# Patient Record
Sex: MALE | Race: WHITE | URBAN - METROPOLITAN AREA
[De-identification: names, ages, dates, MRNs, and addresses within clinical notes are randomized per-mention and may not be internally consistent; named-entity substitution may affect disease eponyms.]

---

## 2017-06-16 ENCOUNTER — APPOINTMENT (OUTPATIENT)
Dept: URBAN - METROPOLITAN AREA CLINIC 211 | Age: 27
Setting detail: DERMATOLOGY
End: 2017-06-20

## 2017-06-16 DIAGNOSIS — B02.9 ZOSTER WITHOUT COMPLICATIONS: ICD-10-CM

## 2017-06-16 PROBLEM — F41.9 ANXIETY DISORDER, UNSPECIFIED: Status: ACTIVE | Noted: 2017-06-16

## 2017-06-16 PROCEDURE — OTHER COUNSELING: OTHER

## 2017-06-16 PROCEDURE — OTHER PRESCRIPTION: OTHER

## 2017-06-16 PROCEDURE — OTHER TREATMENT REGIMEN: OTHER

## 2017-06-16 PROCEDURE — 99202 OFFICE O/P NEW SF 15 MIN: CPT

## 2017-06-16 PROCEDURE — OTHER MIPS QUALITY: OTHER

## 2017-06-16 RX ORDER — VALACYCLOVIR HYDROCHLORIDE 1 G/1
TABLET, FILM COATED ORAL TID
Qty: 21 | Refills: 0 | Status: ERX | COMMUNITY
Start: 2017-06-16

## 2017-06-16 ASSESSMENT — LOCATION SIMPLE DESCRIPTION DERM
LOCATION SIMPLE: T3 RIGHT ANTERIOR DERMATOME
LOCATION SIMPLE: T4 RIGHT ANTERIOR DERMATOME

## 2017-06-16 ASSESSMENT — LOCATION DETAILED DESCRIPTION DERM
LOCATION DETAILED: T3 RIGHT ANTERIOR DERMATOME
LOCATION DETAILED: T4 RIGHT ANTERIOR DERMATOME

## 2017-06-16 ASSESSMENT — LOCATION ZONE DERM: LOCATION ZONE: TRUNK

## 2017-06-16 NOTE — PROCEDURE: MIPS QUALITY
Quality 110: Preventive Care And Screening: Influenza Immunization: Influenza Immunization not Administered because Patient Refused.
Quality 400a: One-Time Screening For Hepatitis C Virus (Hcv) For All Patients: Documentation of patient reason(s) for not receiving one-time screening for HCV infection
Quality 131: Pain Assessment And Follow-Up: Pain assessment documented as positive using a standardized tool AND a follow-up plan is documented
Quality 226: Preventive Care And Screening: Tobacco Use: Screening And Cessation Intervention: Patient screened for tobacco and is a smoker AND received Cessation Counseling
Quality 130: Documentation Of Current Medications In The Medical Record: Current Medications Documented
Quality 431: Preventive Care And Screening: Unhealthy Alcohol Use - Screening: Patient screened for unhealthy alcohol use using a single question and scores less than 2 times per year
Detail Level: Detailed

## 2017-06-16 NOTE — PROCEDURE: TREATMENT REGIMEN
Samples Given: Sernivo AAA BID PRN
Detail Level: Zone
Plan: Discussed can continue Anti-Itch Cream PRN

## 2017-06-16 NOTE — HPI: RASH
How Severe Is Your Rash?: moderate
Is This A New Presentation, Or A Follow-Up?: Rash
Additional History: Pain 0-10.  Patient will follow up with pcp for screening of Hep C.